# Patient Record
Sex: FEMALE | Race: BLACK OR AFRICAN AMERICAN | URBAN - METROPOLITAN AREA
[De-identification: names, ages, dates, MRNs, and addresses within clinical notes are randomized per-mention and may not be internally consistent; named-entity substitution may affect disease eponyms.]

---
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
## 2022-04-09 ENCOUNTER — HISTORICAL (OUTPATIENT)
Dept: ADMINISTRATIVE | Facility: HOSPITAL | Age: 22
End: 2022-04-09

## 2022-04-25 VITALS
DIASTOLIC BLOOD PRESSURE: 70 MMHG | BODY MASS INDEX: 22.03 KG/M2 | HEIGHT: 65 IN | WEIGHT: 132.25 LBS | SYSTOLIC BLOOD PRESSURE: 110 MMHG

## 2022-05-02 NOTE — HISTORICAL [facility]
This is a historical note converted from Julian. Formatting and pictures may have been removed.  Please reference Julian for original formatting and attached multimedia. Chief Complaint  F/U RIGHT KNEE ACL. ?SX: 3/13/19. ?XRAY DONE TODAY. DOING GOOD...SB  History of Present Illness  This is a 18-year-old female that approximately?7 months out from a?all inside?autograft?ACL reconstruction. ?She is doing very well.? She is done with physical therapy.  Review of Systems  GENERAL: No fevers, chills, unexpected weight loss or gain  MUSCULOSKELETAL: Joint pain, extremity pain  Physical Exam  Vitals & Measurements  HT:?160?cm? WT:?60?kg?  General: Well-developed, well-nourished.  Neuro: Alert and oriented x 3.  Psych: Normal mood and affect.  Knee Exam:  No obvious deformity. Range of motion from 0-135 degrees. Negative patella grind and equal subluxation of knee cap medial and lateral < 1cm. Negative patella tendon tenderness. Negative Lachman and anterior drawer test. Negative posterior drawer test. Negative varus and valgus stress test. Negative medial joint line tenderness. Negative lateral joint line tenderness. 5/5 strength and normal skin appearance. Sensibility normal.  Assessment/Plan  1.?Status post reconstruction of anterior cruciate ligament?Z98.890  ?At this time I will get an ACL exit exam on this patient. ?This will more than likely just be at baseline.? She will continue?work on getting her strength better and then we will look at doing another exam in about 2 months.? I am hoping that I can possibly release her?to full activity around that time. ?She should be around 9 months out from surgery at that time.  Ordered:  Clinic Follow up, *Est. 12/15/19 3:00:00 CST, Order for future visit, Status post reconstruction of anterior cruciate ligament, Orthopaedics  Office/Outpatient Visit Level 3 Established [id number] PC, Status post reconstruction of anterior cruciate ligament, Orthopaedics Clinic, 10/15/19  9:44:00 CDT  XR Knee Right 1 or 2 Views, Routine, 10/15/19 9:11:00 CDT, Follow Up Trauma, None, Ambulatory, Rad Type, Status post reconstruction of anterior cruciate ligament, Not Scheduled, 10/15/19 9:11:00 CDT  ?  Referrals  Clinic Follow up, *Est. 12/15/19 3:00:00 CST, Order for future visit, Status post reconstruction of anterior cruciate ligament, [facility]   Problem List/Past Medical History  Ongoing  ACL sprain  Sprain of lateral collateral ligament of right knee  Sprain of posterior cruciate ligament of right knee  Status post reconstruction of anterior cruciate ligament  Historical  No qualifying data  Procedure/Surgical History  Arthroscopically aided anterior cruciate ligament repair/augmentation or reconstruction (03/13/2019)  Arthroscopy ACL Reconstruction (Right) (03/13/2019)  Injection, anesthetic agent; femoral nerve, single (03/13/2019)  Introduction of Anesthetic Agent into Peripheral Nerves and Plexi, Percutaneous Approach (03/13/2019)  Ligamentous reconstruction (augmentation), knee; extra-articular (03/13/2019)  Replacement of Right Knee Bursa and Ligament with Autologous Tissue Substitute, Percutaneous Endoscopic Approach (03/13/2019)  Supplement Right Knee Bursa and Ligament with Nonautologous Tissue Substitute, Open Approach (03/13/2019)   Medications  acetaminophen-hydrocodone 325 mg-5 mg oral tablet, 1 tab(s), Oral, q4hr,? ?Not taking: to start post procedure 3/13  aspirin 81 mg oral Delayed Release (EC) tablet, 81 mg= 1 tab(s), Oral, Daily,? ?Not taking: to start post procedure 3/13  Phenergan 12.5 mg Tab, 12.5 mg= 1 tab(s), Oral, q6hr, PRN,? ?Not taking: to start post procedure 3/13  Allergies  Dust?(sneezing)  Grass?(itching)  Social History  Abuse/Neglect  No, 10/15/2019  Alcohol  Never, 12/21/2018  Employment/School  Student, 03/04/2019  Exercise  Exercise type: Running., 03/04/2019  Home/Environment  Lives with Father, Mother., 03/04/2019  Nutrition/Health  Regular,  03/04/2019  Substance Use  Never, 03/04/2019  Tobacco  Never (less than 100 in lifetime), N/A, 10/15/2019  Health Maintenance  Health Maintenance  ???Pending?(in the next year)  ??? ??OverDue  ??? ? ? ?Adolescent Depression Screening due??01/01/19??and every 1??year(s)  ???Satisfied?(in the past 1 year)  ??? ??Satisfied?  ??? ? ? ?Body Mass Index Check on??05/07/19.??Satisfied by SYSTEM  ??? ? ? ?Influenza Vaccine on??03/04/19.??Satisfied by Héctor HANEY, Marlee GRIFFITH  ?

## 2022-05-02 NOTE — HISTORICAL [facility]
This is a historical note converted from Julian. Formatting and pictures may have been removed.  Please reference [facility] for original formatting and attached multimedia. Chief Complaint  PT. RERPORTS THAT HER RIGHT KNEE BUCKLE DURING A BASKETBALL GAME ON 11/29/18. PT. IS AMBULATING WITH CRUTCHES TODAY AND HAS ON KNEE SLEEVE......SB  History of Present Illness  This is a 18-year-old female? at [employer] that sustained a?right knee buckling injury on 11/29/2018.? She states she was running down the court and her knee just gave out popped on her. ?She states she has some swelling and pain afterwards?but today her pain is minimal and she is able to walk without any crutches.? She was evaluated by her? and then sent to see me today.? She also complains of a history of having leg pain and shin splints.? She has an obvious deformity or lump over the anterior tibia of the left leg.  Review of Systems  GENERAL: No fevers, chills, unexpected weight loss or gain  MUSCULOSKELETAL: Joint pain, extremity pain  Physical Exam  Vitals & Measurements  HT:?163?cm? WT:?58.50?kg? WT:?58.50?kg? BMI:?22.02?  General: Well-developed, well-nourished.  Neuro: Alert and oriented x 3.  Psych: Normal mood and affect.  CV: Palpable radial pulses.  Resp: Smooth and unlabored.  Skin: No evidence of focal lesions or trauma.  Hem/Imm/Lymph: No evidence of lymphangitis or adenopathy.  Gait: No trendelenburg gait.  DTR: 2+, no hypo or hyperreflexia.  Coordination and Balance: No tremors or abnormal station.  Knee Exam:  No obvious deformity. Range of motion from 0-130 degrees. Negative patella grind and equal subluxation of knee cap medial and lateral < 1cm. Negative patella tendon tenderness. ?2+ Lachman and anterior drawer test. Negative posterior drawer test. Negative varus and valgus stress test. Negative medial joint line tenderness. Negative lateral joint line tenderness. 4/5 strength and normal skin  appearance. Sensibility normal.  Assessment/Plan  1.?New tear of anterior cruciate ligament of right knee?S83.511A  ? This patients history and exam I am highly concerned that she has a ligament injury specifically an ACL.? I want to get an MRI to confirm. ?This will also be roadmap for surgery.  Ordered:  MRI Ext Lower Joint Right W/O Contrast, Routine, 12/04/18 11:28:00 CST, Injury, knee & below, None, Ambulatory, Rad Type, Order for future visit, New tear of anterior cruciate ligament of right knee  Shin splint of left lower extremity, Schedule this test, [facility]...  Office/Outpatient Visit Level 3 New [id number] PC, New tear of anterior cruciate ligament of right knee  Shin splint of left lower extremity, [facility], 12/04/18 11:25:00 CST  ?  2.?Shin splint of left lower extremity?S86.892A  ? We will get x-rays of her left tibia on next visit.  Ordered:  MRI Ext Lower Joint Right W/O Contrast, Routine, 12/04/18 11:28:00 CST, Injury, knee & below, None, Ambulatory, Rad Type, Order for future visit, New tear of anterior cruciate ligament of right knee  Shin splint of left lower extremity, Schedule this test, [facility]...  Office/Outpatient Visit Level 3 New [id number] PC, New tear of anterior cruciate ligament of right knee  Shin splint of left lower extremity, [facility], 12/04/18 11:25:00 CST  ?   Problem List/Past Medical History  Ongoing  No qualifying data  Historical  No qualifying data  Medications  No active medications  Allergies  No Known Medication Allergies  Health Maintenance  Health Maintenance  ???Pending?(in the next year)  ??? ??Due?  ??? ? ? ?Adolescent Depression Screening due??12/04/18??and every 1??year(s)  ???Satisfied?(in the past 1 year)  ??? ??Satisfied?  ??? ? ? ?Body Mass Index Check on??12/04/18.??Satisfied by SYSTEM  ?  ?